# Patient Record
Sex: MALE | Race: WHITE | NOT HISPANIC OR LATINO | Employment: FULL TIME | URBAN - METROPOLITAN AREA
[De-identification: names, ages, dates, MRNs, and addresses within clinical notes are randomized per-mention and may not be internally consistent; named-entity substitution may affect disease eponyms.]

---

## 2022-09-21 DIAGNOSIS — Z86.79 HISTORY OF ATRIAL FIBRILLATION: ICD-10-CM

## 2022-09-21 DIAGNOSIS — I48.92 ATRIAL FLUTTER WITH RAPID VENTRICULAR RESPONSE (HCC): ICD-10-CM

## 2022-09-22 LAB — SARS-COV-2 RNA RESP QL NAA+PROBE: NEGATIVE

## 2023-03-23 PROBLEM — H53.9 VISION DISTURBANCE: Status: ACTIVE | Noted: 2023-03-23

## 2023-06-12 ENCOUNTER — APPOINTMENT (EMERGENCY)
Dept: RADIOLOGY | Facility: HOSPITAL | Age: 43
End: 2023-06-12
Payer: COMMERCIAL

## 2023-06-12 ENCOUNTER — APPOINTMENT (EMERGENCY)
Dept: CT IMAGING | Facility: HOSPITAL | Age: 43
End: 2023-06-12
Payer: COMMERCIAL

## 2023-06-12 ENCOUNTER — HOSPITAL ENCOUNTER (EMERGENCY)
Facility: HOSPITAL | Age: 43
Discharge: HOME | End: 2023-06-12
Attending: EMERGENCY MEDICINE
Payer: COMMERCIAL

## 2023-06-12 VITALS
HEART RATE: 73 BPM | SYSTOLIC BLOOD PRESSURE: 154 MMHG | DIASTOLIC BLOOD PRESSURE: 73 MMHG | RESPIRATION RATE: 16 BRPM | OXYGEN SATURATION: 100 % | TEMPERATURE: 97.8 F

## 2023-06-12 DIAGNOSIS — M79.605 LEFT LEG PAIN: Primary | ICD-10-CM

## 2023-06-12 LAB
ALBUMIN SERPL BCP-MCNC: 4.3 G/DL (ref 3.5–5)
ALP SERPL-CCNC: 64 U/L (ref 34–104)
ALT SERPL W P-5'-P-CCNC: 13 U/L (ref 7–52)
ANION GAP SERPL CALCULATED.3IONS-SCNC: 6 MMOL/L (ref 4–13)
AST SERPL W P-5'-P-CCNC: 15 U/L (ref 13–39)
BASOPHILS # BLD AUTO: 0.05 THOUSANDS/ÂΜL (ref 0–0.1)
BASOPHILS NFR BLD AUTO: 1 % (ref 0–1)
BILIRUB SERPL-MCNC: 0.6 MG/DL (ref 0.2–1)
BUN SERPL-MCNC: 19 MG/DL (ref 5–25)
CALCIUM SERPL-MCNC: 8.9 MG/DL (ref 8.4–10.2)
CHLORIDE SERPL-SCNC: 104 MMOL/L (ref 96–108)
CK SERPL-CCNC: 50 U/L (ref 39–308)
CO2 SERPL-SCNC: 29 MMOL/L (ref 21–32)
CREAT SERPL-MCNC: 0.87 MG/DL (ref 0.6–1.3)
EOSINOPHIL # BLD AUTO: 0.16 THOUSAND/ÂΜL (ref 0–0.61)
EOSINOPHIL NFR BLD AUTO: 2 % (ref 0–6)
ERYTHROCYTE [DISTWIDTH] IN BLOOD BY AUTOMATED COUNT: 12.1 % (ref 11.6–15.1)
GFR SERPL CREATININE-BSD FRML MDRD: 107 ML/MIN/1.73SQ M
GLUCOSE SERPL-MCNC: 124 MG/DL (ref 65–140)
HCT VFR BLD AUTO: 49.3 % (ref 36.5–49.3)
HGB BLD-MCNC: 16.8 G/DL (ref 12–17)
IMM GRANULOCYTES # BLD AUTO: 0.06 THOUSAND/UL (ref 0–0.2)
IMM GRANULOCYTES NFR BLD AUTO: 1 % (ref 0–2)
LYMPHOCYTES # BLD AUTO: 1.37 THOUSANDS/ÂΜL (ref 0.6–4.47)
LYMPHOCYTES NFR BLD AUTO: 13 % (ref 14–44)
MCH RBC QN AUTO: 31.8 PG (ref 26.8–34.3)
MCHC RBC AUTO-ENTMCNC: 34.1 G/DL (ref 31.4–37.4)
MCV RBC AUTO: 93 FL (ref 82–98)
MONOCYTES # BLD AUTO: 0.73 THOUSAND/ÂΜL (ref 0.17–1.22)
MONOCYTES NFR BLD AUTO: 7 % (ref 4–12)
NEUTROPHILS # BLD AUTO: 8.42 THOUSANDS/ÂΜL (ref 1.85–7.62)
NEUTS SEG NFR BLD AUTO: 76 % (ref 43–75)
NRBC BLD AUTO-RTO: 0 /100 WBCS
PLATELET # BLD AUTO: 209 THOUSANDS/UL (ref 149–390)
PMV BLD AUTO: 9.7 FL (ref 8.9–12.7)
POTASSIUM SERPL-SCNC: 4.5 MMOL/L (ref 3.5–5.3)
PROT SERPL-MCNC: 6.9 G/DL (ref 6.4–8.4)
RBC # BLD AUTO: 5.28 MILLION/UL (ref 3.88–5.62)
SODIUM SERPL-SCNC: 139 MMOL/L (ref 135–147)
WBC # BLD AUTO: 10.79 THOUSAND/UL (ref 4.31–10.16)

## 2023-06-12 PROCEDURE — 80053 COMPREHEN METABOLIC PANEL: CPT | Performed by: PHYSICIAN ASSISTANT

## 2023-06-12 PROCEDURE — 85025 COMPLETE CBC W/AUTO DIFF WBC: CPT | Performed by: PHYSICIAN ASSISTANT

## 2023-06-12 PROCEDURE — 73701 CT LOWER EXTREMITY W/DYE: CPT

## 2023-06-12 PROCEDURE — 36415 COLL VENOUS BLD VENIPUNCTURE: CPT | Performed by: PHYSICIAN ASSISTANT

## 2023-06-12 PROCEDURE — G1004 CDSM NDSC: HCPCS

## 2023-06-12 PROCEDURE — 73552 X-RAY EXAM OF FEMUR 2/>: CPT

## 2023-06-12 PROCEDURE — 82550 ASSAY OF CK (CPK): CPT | Performed by: PHYSICIAN ASSISTANT

## 2023-06-12 RX ADMIN — IOHEXOL 100 ML: 350 INJECTION, SOLUTION INTRAVENOUS at 12:02

## 2023-06-13 NOTE — ED PROVIDER NOTES
History  Chief Complaint   Patient presents with   • Leg Pain     Left thigh pain since Wednesday  No known injury but is active at work     72-year-old male presents to the emergency department with complaints of left-sided leg pain  States that he has had intermittent pain in the left thigh over the past several days  Describes pain as sharp in nature and feels like it originates from deep within the muscle  States that his leg feels warm when pain starts both on the inside and to the touch  He denies overlying skin changes presently  States the pain last several seconds to minutes prior to spontaneous resolution  Denies recent injury to the area  Does have history of back pain without radiation into the leg  History provided by:  Patient   used: No    Leg Pain  Location:  Leg  Injury: no    Leg location:  L upper leg  Pain details:     Quality:  Sharp    Radiates to:  Does not radiate    Severity:  Moderate    Onset quality:  Gradual    Timing:  Intermittent    Progression:  Waxing and waning  Chronicity:  New  Foreign body present:  No foreign bodies  Prior injury to area:  No  Relieved by:  Nothing  Worsened by:  Nothing  Ineffective treatments:  None tried  Associated symptoms: back pain    Associated symptoms: no decreased ROM, no fatigue, no fever, no itching, no muscle weakness, no neck pain, no numbness, no stiffness, no swelling and no tingling        Prior to Admission Medications   Prescriptions Last Dose Informant Patient Reported? Taking?    Eliquis 5 MG 6/12/2023 at 0730  No Yes   Sig: TAKE 1 TABLET BY MOUTH TWICE A DAY   levothyroxine 175 mcg tablet 6/12/2023 at 0730  No Yes   Sig: TAKE 1 TABLET (175 MCG TOTAL) BY MOUTH DAILY IN THE EARLY MORNING   naltrexone (REVIA) 50 mg tablet   No No   Sig: Take 1 tablet (50 mg total) by mouth daily   Patient not taking: Reported on 3/23/2023   nicotine (NICODERM CQ) 21 mg/24 hr TD 24 hr patch  Self No No   Sig: Place 1 patch on the skin daily   Patient not taking: Reported on 3/23/2023   nicotine polacrilex (NICORETTE) 2 mg gum   No No   Sig: Chew 1 each (2 mg total) every 2 (two) hours as needed for smoking cessation   Patient not taking: Reported on 3/23/2023      Facility-Administered Medications: None       Past Medical History:   Diagnosis Date   • Disease of thyroid gland    • History of atrial fibrillation     assocaiated with thyroid disease   • Stroke ([facility] [address] )     stroke syndrome, occipital infarction- associated w/ afib   • Thrombocytopenia ([facility] [address] )        Past Surgical History:   Procedure Laterality Date   • CARDIAC ELECTROPHYSIOLOGY PROCEDURE N/A 9/27/2022    Procedure: Cardiac eps/afib ablation;  Surgeon: Rosi Foy DO;  Location: [facility]; Service: Cardiology   • LIPOMA RESECTION Left 8/15/2017    Procedure: CALF FOREIGN BODY REMOVAL WITH FLUORO;  Surgeon: Santiago Aragon MD;  Location:  MAIN OR;  Service: General   • TOTAL THYROIDECTOMY         Family History   Problem Relation Age of Onset   • Thyroid disease Mother    • Multiple sclerosis Mother    • Heart disease Father    • Thyroid disease Sister      I have reviewed and agree with the history as documented  E-Cigarette/Vaping   • E-Cigarette Use Former User      E-Cigarette/Vaping Substances     Social History     Tobacco Use   • Smoking status: Every Day     Packs/day: 0 50     Types: Cigarettes   • Smokeless tobacco: Never   • Tobacco comments:     working on quitting  USING VAPE PEN   Vaping Use   • Vaping Use: Former   Substance Use Topics   • Alcohol use: Yes     Comment: daily   • Drug use: No       Review of Systems   Constitutional: Negative for activity change, appetite change, chills, fatigue and fever  HENT: Negative for congestion, dental problem, drooling, ear discharge, ear pain, mouth sores, nosebleeds, rhinorrhea, sore throat and trouble swallowing  Eyes: Negative for pain, discharge and itching     Respiratory: Negative for cough, chest tightness, shortness of breath and wheezing  Cardiovascular: Negative for chest pain and palpitations  Gastrointestinal: Negative for abdominal pain, blood in stool, constipation, diarrhea, nausea and vomiting  Endocrine: Negative for cold intolerance and heat intolerance  Genitourinary: Negative for difficulty urinating, dysuria, flank pain, frequency and urgency  Musculoskeletal: Positive for back pain and myalgias (leg pain)  Negative for neck pain and stiffness  Skin: Negative for itching, rash and wound  Allergic/Immunologic: Negative for food allergies and immunocompromised state  Neurological: Negative for dizziness, seizures, syncope, weakness, numbness and headaches  Psychiatric/Behavioral: Negative for agitation, behavioral problems and confusion  Physical Exam  Physical Exam  Vitals and nursing note reviewed  Constitutional:       Appearance: He is well-developed  HENT:      Head: Normocephalic and atraumatic  Cardiovascular:      Rate and Rhythm: Normal rate and regular rhythm  Pulmonary:      Effort: Pulmonary effort is normal  No respiratory distress  Breath sounds: No wheezing, rhonchi or rales  Musculoskeletal:        Legs:    Skin:     General: Skin is warm and dry  Neurological:      Mental Status: He is alert and oriented to person, place, and time     Psychiatric:         Mood and Affect: Mood normal          Behavior: Behavior normal          Vital Signs  ED Triage Vitals   Temperature Pulse Respirations Blood Pressure SpO2   06/12/23 0938 06/12/23 0927 06/12/23 0927 06/12/23 0927 06/12/23 0927   97 8 °F (36 6 °C) 73 16 154/73 100 %      Temp src Heart Rate Source Patient Position - Orthostatic VS BP Location FiO2 (%)   -- -- 06/12/23 0927 06/12/23 0927 --     Sitting Right arm       Pain Score       --                  Vitals:    06/12/23 0927   BP: 154/73   Pulse: 73   Patient Position - Orthostatic VS: Sitting         Visual Acuity      ED Medications  Medications   iohexol (OMNIPAQUE) 350 MG/ML injection (SINGLE-DOSE) 100 mL (100 mL Intravenous Given 6/12/23 1202)       Diagnostic Studies  Results Reviewed     Procedure Component Value Units Date/Time    Comprehensive metabolic panel [[id number]] Collected: 06/12/23 1001    Lab Status: Final result Specimen: Blood from Arm, Right Updated: 06/12/23 1027     Sodium 139 mmol/L      Potassium 4 5 mmol/L      Chloride 104 mmol/L      CO2 29 mmol/L      ANION GAP 6 mmol/L      BUN 19 mg/dL      Creatinine 0 87 mg/dL      Glucose 124 mg/dL      Calcium 8 9 mg/dL      AST 15 U/L      ALT 13 U/L      Alkaline Phosphatase 64 U/L      Total Protein 6 9 g/dL      Albumin 4 3 g/dL      Total Bilirubin 0 60 mg/dL      eGFR 107 ml/min/1 73sq m     Narrative:      National Kidney Disease Foundation guidelines for Chronic Kidney Disease (CKD):   •  Stage 1 with normal or high GFR (GFR > 90 mL/min/1 73 square meters)  •  Stage 2 Mild CKD (GFR = 60-89 mL/min/1 73 square meters)  •  Stage 3A Moderate CKD (GFR = 45-59 mL/min/1 73 square meters)  •  Stage 3B Moderate CKD (GFR = 30-44 mL/min/1 73 square meters)  •  Stage 4 Severe CKD (GFR = 15-29 mL/min/1 73 square meters)  •  Stage 5 End Stage CKD (GFR <15 mL/min/1 73 square meters)  Note: GFR calculation is accurate only with a steady state creatinine    CK [[id number]]  (Normal) Collected: 06/12/23 1001    Lab Status: Final result Specimen: Blood from Arm, Right Updated: 06/12/23 1027     Total CK 50 U/L     CBC and differential [[id number]]  (Abnormal) Collected: 06/12/23 1001    Lab Status: Final result Specimen: Blood from Arm, Right Updated: 06/12/23 1011     WBC 10 79 Thousand/uL      RBC 5 28 Million/uL      Hemoglobin 16 8 g/dL      Hematocrit 49 3 %      MCV 93 fL      MCH 31 8 pg      MCHC 34 1 g/dL      RDW 12 1 %      MPV 9 7 fL      Platelets 936 Thousands/uL      nRBC 0 /100 WBCs      Neutrophils Relative 76 %      Immat GRANS % 1 %      Lymphocytes Relative 13 %      Monocytes Relative 7 %      Eosinophils Relative 2 %      Basophils Relative 1 %      Neutrophils Absolute 8 42 Thousands/µL      Immature Grans Absolute 0 06 Thousand/uL      Lymphocytes Absolute 1 37 Thousands/µL      Monocytes Absolute 0 73 Thousand/µL      Eosinophils Absolute 0 16 Thousand/µL      Basophils Absolute 0 05 Thousands/µL                  CT lower extremity w contrast left   Final Result by Julian Velez MD (06/12 1245)      No acute osseous abnormality  No discrete soft tissue mass or fluid collection  Workstation performed: [id number]         XR femur 2 views LEFT   ED Interpretation by Sarath Neal PA-C (06/12 1024)   Normal xray of the femur      Final Result by Frandy Allen MD (06/12 1633)      No acute osseous abnormality  Workstation performed: [id number]                    Procedures  Procedures         ED Course                                             Medical Decision Making  Differential diagnosis includes but not limited to: Retained foreign body, deep-seated infection, musculoskeletal pain, referred pain    Left leg pain: acute illness or injury  Amount and/or Complexity of Data Reviewed  Labs: ordered  Radiology: ordered and independent interpretation performed  Risk  Prescription drug management  Disposition  Final diagnoses:   Left leg pain     Time reflects when diagnosis was documented in both MDM as applicable and the Disposition within this note     Time User Action Codes Description Comment    6/12/2023  1:04 PM Carmela Lozada Add [Z79 [id number]] Left leg pain       ED Disposition     ED Disposition   Discharge    Condition   Stable    Date/Time   Mon Jun 12, 2023  1:04 PM    Comment   Vlad Guzman discharge to home/self care  Follow-up Information     Follow up With Specialties Details Why [address], MD Family Medicine   [address]  [phone]       [facility] Orthopedic Surgery Schedule an appointment as soon as possible for a visit   [address] [facility] [address] [facility], [address] [place] [address], [phone]          Discharge Medication List as of 6/12/2023  1:05 PM      CONTINUE these medications which have NOT CHANGED    Details   Eliquis 5 MG TAKE 1 TABLET BY MOUTH TWICE A DAY, Normal      levothyroxine 175 mcg tablet TAKE 1 TABLET (175 MCG TOTAL) BY MOUTH DAILY IN THE EARLY MORNING, Starting Sat 4/29/2023, Until Fri 7/28/2023, Normal      naltrexone (REVIA) 50 mg tablet Take 1 tablet (50 mg total) by mouth daily, Starting Wed 11/2/2022, Normal      nicotine (NICODERM CQ) 21 mg/24 hr TD 24 hr patch Place 1 patch on the skin daily, Starting Wed 9/28/2022, Normal      nicotine polacrilex (NICORETTE) 2 mg gum Chew 1 each (2 mg total) every 2 (two) hours as needed for smoking cessation, Starting Sun 6/5/2022, Normal             No discharge procedures on file      PDMP Review       Value Time User    PDMP Reviewed  Yes 8/11/2022  1:17 AM Barber Stacy MD          ED Provider  Electronically Signed by           Rosie Raman PA-C  [facility]13/23 [facility]  06/13/23 5277

## 2023-10-12 ENCOUNTER — OFFICE VISIT (OUTPATIENT)
Dept: FAMILY MEDICINE CLINIC | Facility: CLINIC | Age: 43
End: 2023-10-12
Payer: COMMERCIAL

## 2023-10-12 VITALS
SYSTOLIC BLOOD PRESSURE: 123 MMHG | WEIGHT: 210 LBS | DIASTOLIC BLOOD PRESSURE: 76 MMHG | HEART RATE: 66 BPM | OXYGEN SATURATION: 99 % | RESPIRATION RATE: 16 BRPM | BODY MASS INDEX: 28.44 KG/M2 | HEIGHT: 72 IN | TEMPERATURE: 98.6 F

## 2023-10-12 DIAGNOSIS — F17.200 NICOTINE ADDICTION: ICD-10-CM

## 2023-10-12 DIAGNOSIS — Z12.5 SCREENING FOR PROSTATE CANCER: ICD-10-CM

## 2023-10-12 DIAGNOSIS — Z13.6 SCREENING FOR CARDIOVASCULAR CONDITION: ICD-10-CM

## 2023-10-12 DIAGNOSIS — E89.0 POSTOPERATIVE HYPOTHYROIDISM: Primary | ICD-10-CM

## 2023-10-12 DIAGNOSIS — Z11.59 ENCOUNTER FOR HEPATITIS C SCREENING TEST FOR LOW RISK PATIENT: ICD-10-CM

## 2023-10-12 DIAGNOSIS — Z13.1 SCREENING FOR DIABETES MELLITUS: ICD-10-CM

## 2023-10-12 DIAGNOSIS — Z11.4 SCREENING FOR HIV (HUMAN IMMUNODEFICIENCY VIRUS): ICD-10-CM

## 2023-10-12 PROCEDURE — 99214 OFFICE O/P EST MOD 30 MIN: CPT | Performed by: FAMILY MEDICINE

## 2023-10-12 NOTE — PROGRESS NOTES
Name: Lieutenant Hernández      : 1980      MRN: [id number]  Encounter Provider: Debbie Briceno MD  Encounter Date: 10/12/2023   Encounter department: [address] [place] [address]     1. Postoperative hypothyroidism  -     TSH, 3rd generation with Free T4 reflex; Future  -     Vitamin D 25 hydroxy; Future  -     US thyroid; Future; Expected date: 10/12/2023    2. Nicotine addiction  -     nicotine polacrilex (NICORETTE) 2 mg gum; Chew 1 each (2 mg total) every 2 (two) hours as needed for smoking cessation  -     Vitamin D 25 hydroxy; Future    3. Screening for diabetes mellitus  -     TSH, 3rd generation with Free T4 reflex; Future  -     Hemoglobin A1C; Future  -     Albumin / creatinine urine ratio; Future    4. Screening for cardiovascular condition  -     CBC and differential; Future  -     Comprehensive metabolic panel; Future  -     Lipid panel; Future  -     UA w Reflex to Microscopic w Reflex to Culture; Future; Expected date: 10/12/2023  -     Albumin / creatinine urine ratio; Future    5. Screening for prostate cancer  -     PSA, Total Screen; Future    6. Screening for HIV (human immunodeficiency virus)  -     [facility]; Future    7. Encounter for hepatitis C screening test for low risk patient  -     Hepatitis C antibody; Future      Patient was ordered basic labs occluding the other test above. Patient gave permission for the PSA and the screening test for HIV and hepatitis C. Patient was encouraged to cut to quit tobacco completely. Patient was asked to get a thyroid sonogram to evaluate the thyroid bed status post surgery. Patient will ask his cardiologist as to how many days prior to his dental procedure procedures he will need just hold his Eliquis. Patient declined to get the Flu vaccine and the pneumonia vaccine as well. RTO 2 months for physical.    BMI Counseling: Body mass index is 28.48 kg/m².  The BMI is above normal. Nutrition recommendations include decreasing portion sizes, encouraging healthy choices of fruits and vegetables, consuming healthier snacks and moderation in carbohydrate intake. Exercise recommendations include exercising 3-5 times per week. No pharmacotherapy was ordered. Patient referred to PCP. Rationale for BMI follow-up plan is due to patient being overweight or obese. Tobacco Cessation Counseling: Tobacco cessation counseling was provided. The patient is sincerely urged to quit consumption of tobacco. He is ready to quit tobacco. Medication options and side effects of medication discussed. Patient agreed to medication. Nicotine patch was prescribed. Refilled his patch today, and counseled to quit completely. Subjective      46-year-old male here for routine follow-up. Patient wants a refill on his nicotine patch. Says he is trying to cut down to quit. Patient has A-fib and is on Eliquis per cardio. Patient is status post thyroidectomy for Graves' disease. Patient also has a history of stroke and with no deficits. Patient is not taking his naloxone because he felt foggy and dizzy from it so he stopped it. Patient does not drink as much as he did before. Patient denies anxiety depression has no SI HI. Review of Systems   Constitutional:  Negative for chills, fatigue and fever. HENT:  Positive for dental problem. Negative for congestion and sore throat. Eyes:  Negative for visual disturbance. Respiratory:  Negative for cough and shortness of breath. Cardiovascular:  Negative for chest pain and palpitations. Gastrointestinal:  Negative for abdominal pain and blood in stool. Genitourinary:  Negative for dysuria and hematuria. Skin:  Negative for rash. Neurological:  Negative for dizziness and headaches. Psychiatric/Behavioral:  Negative for dysphoric mood. The patient is not nervous/anxious.         Current Outpatient Medications on File Prior to Visit   Medication Sig • Eliquis 5 MG TAKE 1 TABLET BY MOUTH TWICE A DAY   • levothyroxine 175 mcg tablet TAKE 1 TABLET (175 MCG TOTAL) BY MOUTH DAILY IN THE EARLY MORNING   • nicotine (NICODERM CQ) 21 mg/24 hr TD 24 hr patch Place 1 patch on the skin daily (Patient not taking: Reported on 3/23/2023)   • [DISCONTINUED] naltrexone (REVIA) 50 mg tablet Take 1 tablet (50 mg total) by mouth daily (Patient not taking: Reported on 10/12/2023)   • [DISCONTINUED] nicotine polacrilex (NICORETTE) 2 mg gum Chew 1 each (2 mg total) every 2 (two) hours as needed for smoking cessation (Patient not taking: Reported on 3/23/2023)       Objective     /76 (BP Location: Left arm, Patient Position: Sitting, Cuff Size: Adult)   Pulse 66   Temp 98.6 °F (37 °C)   Resp 16   Ht 6' (1.829 m)   Wt 95.3 kg (210 lb)   SpO2 99%   BMI 28.48 kg/m²     Physical Exam  Constitutional:       General: He is not in acute distress. Appearance: Normal appearance. He is not ill-appearing. HENT:      Head: Normocephalic and atraumatic. Mouth/Throat:      Mouth: Mucous membranes are moist.      Pharynx: Oropharynx is clear. Comments: Poor teeth and patient is in the process of dental treatment. Eyes:      Extraocular Movements: Extraocular movements intact. Conjunctiva/sclera: Conjunctivae normal.   Neck:      Vascular: No carotid bruit. Cardiovascular:      Rate and Rhythm: Normal rate and regular rhythm. Pulmonary:      Effort: Pulmonary effort is normal.      Breath sounds: Normal breath sounds. Musculoskeletal:      Right lower leg: No edema. Left lower leg: No edema. Comments: No calf tenderness bilateral   Lymphadenopathy:      Cervical: No cervical adenopathy. Skin:     General: Skin is warm. Neurological:      General: No focal deficit present. Mental Status: He is alert and oriented to person, place, and time. Cranial Nerves: No cranial nerve deficit.    Psychiatric:         Mood and Affect: Mood normal. Behavior: Behavior normal.         Thought Content:  Thought content normal.       Willie Serrato MD

## 2023-10-18 DIAGNOSIS — F17.200 NICOTINE ADDICTION: ICD-10-CM

## 2023-10-18 RX ORDER — NICOTINE 21 MG/24HR
1 PATCH, TRANSDERMAL 24 HOURS TRANSDERMAL DAILY
Qty: 30 PATCH | Refills: 0 | Status: SHIPPED | OUTPATIENT
Start: 2023-10-18

## 2023-10-24 DIAGNOSIS — F17.200 NICOTINE ADDICTION: ICD-10-CM

## 2023-10-24 RX ORDER — NICOTINE 21 MG/24HR
1 PATCH, TRANSDERMAL 24 HOURS TRANSDERMAL DAILY
Qty: 30 PATCH | Refills: 0 | OUTPATIENT
Start: 2023-10-24

## 2023-10-29 DIAGNOSIS — E03.9 HYPOTHYROIDISM, UNSPECIFIED TYPE: ICD-10-CM

## 2023-10-29 RX ORDER — LEVOTHYROXINE SODIUM 175 UG/1
175 TABLET ORAL
Qty: 90 TABLET | Refills: 0 | Status: SHIPPED | OUTPATIENT
Start: 2023-10-29 | End: 2024-01-27

## 2023-12-16 ENCOUNTER — APPOINTMENT (OUTPATIENT)
Dept: LAB | Facility: CLINIC | Age: 43
End: 2023-12-16
Payer: COMMERCIAL

## 2023-12-16 DIAGNOSIS — E89.0 POSTOPERATIVE HYPOTHYROIDISM: ICD-10-CM

## 2023-12-16 DIAGNOSIS — F17.200 NICOTINE ADDICTION: ICD-10-CM

## 2023-12-16 DIAGNOSIS — Z11.4 SCREENING FOR HIV (HUMAN IMMUNODEFICIENCY VIRUS): ICD-10-CM

## 2023-12-16 DIAGNOSIS — Z11.59 ENCOUNTER FOR HEPATITIS C SCREENING TEST FOR LOW RISK PATIENT: ICD-10-CM

## 2023-12-16 DIAGNOSIS — Z12.5 SCREENING FOR PROSTATE CANCER: ICD-10-CM

## 2023-12-16 DIAGNOSIS — Z13.1 SCREENING FOR DIABETES MELLITUS: ICD-10-CM

## 2023-12-16 DIAGNOSIS — F10.10 ALCOHOL ABUSE: ICD-10-CM

## 2023-12-16 DIAGNOSIS — Z13.6 SCREENING FOR CARDIOVASCULAR CONDITION: ICD-10-CM

## 2023-12-16 LAB
25(OH)D3 SERPL-MCNC: 31.8 NG/ML (ref 30–100)
ALBUMIN SERPL BCP-MCNC: 4.4 G/DL (ref 3.5–5)
ALP SERPL-CCNC: 51 U/L (ref 34–104)
ALT SERPL W P-5'-P-CCNC: 18 U/L (ref 7–52)
ANION GAP SERPL CALCULATED.3IONS-SCNC: 6 MMOL/L
AST SERPL W P-5'-P-CCNC: 22 U/L (ref 13–39)
BASOPHILS # BLD AUTO: 0.04 THOUSANDS/ÂΜL (ref 0–0.1)
BASOPHILS NFR BLD AUTO: 1 % (ref 0–1)
BILIRUB SERPL-MCNC: 0.55 MG/DL (ref 0.2–1)
BILIRUB UR QL STRIP: NEGATIVE
BUN SERPL-MCNC: 14 MG/DL (ref 5–25)
CALCIUM SERPL-MCNC: 10.2 MG/DL (ref 8.4–10.2)
CHLORIDE SERPL-SCNC: 107 MMOL/L (ref 96–108)
CHOLEST SERPL-MCNC: 210 MG/DL
CLARITY UR: CLEAR
CO2 SERPL-SCNC: 29 MMOL/L (ref 21–32)
COLOR UR: YELLOW
CREAT SERPL-MCNC: 0.91 MG/DL (ref 0.6–1.3)
CREAT UR-MCNC: 132.9 MG/DL
EOSINOPHIL # BLD AUTO: 0.32 THOUSAND/ÂΜL (ref 0–0.61)
EOSINOPHIL NFR BLD AUTO: 4 % (ref 0–6)
ERYTHROCYTE [DISTWIDTH] IN BLOOD BY AUTOMATED COUNT: 11.9 % (ref 11.6–15.1)
EST. AVERAGE GLUCOSE BLD GHB EST-MCNC: 94 MG/DL
FOLATE SERPL-MCNC: 9.9 NG/ML
GFR SERPL CREATININE-BSD FRML MDRD: 104 ML/MIN/1.73SQ M
GLUCOSE P FAST SERPL-MCNC: 96 MG/DL (ref 65–99)
GLUCOSE UR STRIP-MCNC: NEGATIVE MG/DL
HBA1C MFR BLD: 4.9 %
HCT VFR BLD AUTO: 50.5 % (ref 36.5–49.3)
HCV AB SER QL: NORMAL
HDLC SERPL-MCNC: 67 MG/DL
HGB BLD-MCNC: 17.1 G/DL (ref 12–17)
HGB UR QL STRIP.AUTO: NEGATIVE
IMM GRANULOCYTES # BLD AUTO: 0.03 THOUSAND/UL (ref 0–0.2)
IMM GRANULOCYTES NFR BLD AUTO: 0 % (ref 0–2)
KETONES UR STRIP-MCNC: NEGATIVE MG/DL
LDLC SERPL CALC-MCNC: 130 MG/DL (ref 0–100)
LEUKOCYTE ESTERASE UR QL STRIP: NEGATIVE
LYMPHOCYTES # BLD AUTO: 1.64 THOUSANDS/ÂΜL (ref 0.6–4.47)
LYMPHOCYTES NFR BLD AUTO: 21 % (ref 14–44)
MCH RBC QN AUTO: 32.9 PG (ref 26.8–34.3)
MCHC RBC AUTO-ENTMCNC: 33.9 G/DL (ref 31.4–37.4)
MCV RBC AUTO: 97 FL (ref 82–98)
MICROALBUMIN UR-MCNC: <7 MG/L
MICROALBUMIN/CREAT 24H UR: <5 MG/G CREATININE (ref 0–30)
MONOCYTES # BLD AUTO: 0.62 THOUSAND/ÂΜL (ref 0.17–1.22)
MONOCYTES NFR BLD AUTO: 8 % (ref 4–12)
NEUTROPHILS # BLD AUTO: 5.31 THOUSANDS/ÂΜL (ref 1.85–7.62)
NEUTS SEG NFR BLD AUTO: 66 % (ref 43–75)
NITRITE UR QL STRIP: NEGATIVE
NONHDLC SERPL-MCNC: 143 MG/DL
NRBC BLD AUTO-RTO: 0 /100 WBCS
PH UR STRIP.AUTO: 6.5 [PH]
PLATELET # BLD AUTO: 211 THOUSANDS/UL (ref 149–390)
PMV BLD AUTO: 10.4 FL (ref 8.9–12.7)
POTASSIUM SERPL-SCNC: 4.5 MMOL/L (ref 3.5–5.3)
PROT SERPL-MCNC: 6.7 G/DL (ref 6.4–8.4)
PROT UR STRIP-MCNC: NEGATIVE MG/DL
PSA SERPL-MCNC: 0.62 NG/ML (ref 0–4)
RBC # BLD AUTO: 5.19 MILLION/UL (ref 3.88–5.62)
SODIUM SERPL-SCNC: 142 MMOL/L (ref 135–147)
SP GR UR STRIP.AUTO: 1.02 (ref 1–1.03)
TRIGL SERPL-MCNC: 63 MG/DL
TSH SERPL DL<=0.05 MIU/L-ACNC: 1.29 UIU/ML (ref 0.45–4.5)
UROBILINOGEN UR STRIP-ACNC: <2 MG/DL
VIT B12 SERPL-MCNC: 213 PG/ML (ref 180–914)
WBC # BLD AUTO: 7.96 THOUSAND/UL (ref 4.31–10.16)

## 2023-12-16 PROCEDURE — 82746 ASSAY OF FOLIC ACID SERUM: CPT

## 2023-12-16 PROCEDURE — 82043 UR ALBUMIN QUANTITATIVE: CPT

## 2023-12-16 PROCEDURE — 36415 COLL VENOUS BLD VENIPUNCTURE: CPT

## 2023-12-16 PROCEDURE — 80061 LIPID PANEL: CPT

## 2023-12-16 PROCEDURE — 82607 VITAMIN B-12: CPT

## 2023-12-16 PROCEDURE — 83036 HEMOGLOBIN GLYCOSYLATED A1C: CPT

## 2023-12-16 PROCEDURE — 80053 COMPREHEN METABOLIC PANEL: CPT

## 2023-12-16 PROCEDURE — 86803 HEPATITIS C AB TEST: CPT

## 2023-12-16 PROCEDURE — 85025 COMPLETE CBC W/AUTO DIFF WBC: CPT

## 2023-12-16 PROCEDURE — G0103 PSA SCREENING: HCPCS

## 2023-12-16 PROCEDURE — 87536 HIV-1 QUANT&REVRSE TRNSCRPJ: CPT

## 2023-12-16 PROCEDURE — 82306 VITAMIN D 25 HYDROXY: CPT

## 2023-12-16 PROCEDURE — 84443 ASSAY THYROID STIM HORMONE: CPT

## 2023-12-16 PROCEDURE — 81003 URINALYSIS AUTO W/O SCOPE: CPT

## 2023-12-16 PROCEDURE — 82570 ASSAY OF URINE CREATININE: CPT

## 2023-12-18 LAB — HIV1 RNA # PLAS NAA DL=20: NOT DETECTED {COPIES}/ML

## 2024-01-21 ENCOUNTER — APPOINTMENT (EMERGENCY)
Dept: RADIOLOGY | Facility: HOSPITAL | Age: 44
End: 2024-01-21
Payer: COMMERCIAL

## 2024-01-21 ENCOUNTER — HOSPITAL ENCOUNTER (EMERGENCY)
Facility: HOSPITAL | Age: 44
Discharge: HOME | End: 2024-01-21
Attending: EMERGENCY MEDICINE | Admitting: EMERGENCY MEDICINE
Payer: COMMERCIAL

## 2024-01-21 VITALS
HEART RATE: 68 BPM | DIASTOLIC BLOOD PRESSURE: 78 MMHG | OXYGEN SATURATION: 100 % | SYSTOLIC BLOOD PRESSURE: 136 MMHG | RESPIRATION RATE: 18 BRPM | TEMPERATURE: 99.1 F

## 2024-01-21 DIAGNOSIS — S62.009A SCAPHOID FRACTURE: Primary | ICD-10-CM

## 2024-01-21 DIAGNOSIS — S62.009A: ICD-10-CM

## 2024-01-21 PROCEDURE — 73110 X-RAY EXAM OF WRIST: CPT

## 2024-01-21 PROCEDURE — 99284 EMERGENCY DEPT VISIT MOD MDM: CPT | Performed by: EMERGENCY MEDICINE

## 2024-01-21 PROCEDURE — 90715 TDAP VACCINE 7 YRS/> IM: CPT | Performed by: EMERGENCY MEDICINE

## 2024-01-21 PROCEDURE — 29125 APPL SHORT ARM SPLINT STATIC: CPT | Performed by: EMERGENCY MEDICINE

## 2024-01-21 PROCEDURE — 90471 IMMUNIZATION ADMIN: CPT

## 2024-01-21 PROCEDURE — 99283 EMERGENCY DEPT VISIT LOW MDM: CPT

## 2024-01-21 RX ORDER — ACETAMINOPHEN 325 MG/1
650 TABLET ORAL ONCE
Status: COMPLETED | OUTPATIENT
Start: 2024-01-21 | End: 2024-01-21

## 2024-01-21 RX ADMIN — TETANUS TOXOID, REDUCED DIPHTHERIA TOXOID AND ACELLULAR PERTUSSIS VACCINE, ADSORBED 0.5 ML: 5; 2.5; 8; 8; 2.5 SUSPENSION INTRAMUSCULAR at 10:01

## 2024-01-21 RX ADMIN — ACETAMINOPHEN 650 MG: 325 TABLET, FILM COATED ORAL at 09:59

## 2024-01-21 NOTE — ED PROVIDER NOTES
History  Chief Complaint   Patient presents with    Fall     Pt reports fall on ice last night. (-)HS/LOC, (+) eliquis. Pt c/o 6/10 L wrist/hand pain.      43-year-old right-handed male presents emergency department for evaluation of left wrist pain.  Patient states he was carrying packages last evening when he slipped on ice falling on the left wrist.  Patient is having pain at the dorsum of the wrist.  He notes swelling and decreased range of motion.  No previous wrist injury.  Patient takes Eliquis.  He did not have head strike denies headache or neck pain.  Patient has a small abrasion to the right thumb, does not believe that his tetanus immunization is up-to-date      History provided by:  Patient and medical records   used: No    Fall  Mechanism of injury: fall    Injury location:  Shoulder/arm  Shoulder/arm injury location:  L wrist  Incident location:  Outdoors  Time since incident:  1 day  Arrived directly from scene: no    Fall:     Fall occurred: Slipped on ice.    Height of fall:  From standing    Impact surface:  Ice    Point of impact:  Outstretched arms  Tetanus status:  Out of date  Prior to arrival data:     Patient ambulatory at scene: yes      Loss of consciousness: no      Amnesic to event: no    Associated symptoms: no back pain, no difficulty breathing and no headaches    Risk factors: anticoagulation therapy        Prior to Admission Medications   Prescriptions Last Dose Informant Patient Reported? Taking?   Eliquis 5 MG  Self No No   Sig: TAKE 1 TABLET BY MOUTH TWICE A DAY   levothyroxine 175 mcg tablet   No No   Sig: TAKE 1 TABLET (175 MCG TOTAL) BY MOUTH DAILY IN THE EARLY MORNING   nicotine (NICODERM CQ) 21 mg/24 hr TD 24 hr patch   No No   Sig: Place 1 patch on the skin over 24 hours daily   nicotine polacrilex (NICORETTE) 2 mg gum   No No   Sig: Chew 1 each (2 mg total) every 2 (two) hours as needed for smoking cessation      Facility-Administered Medications: None        Past Medical History:   Diagnosis Date    Disease of thyroid gland     History of atrial fibrillation     assocaiated with thyroid disease    Stroke (HCC)     stroke syndrome, occipital infarction- associated w/ afib    Thrombocytopenia (HCC)        Past Surgical History:   Procedure Laterality Date    CARDIAC ELECTROPHYSIOLOGY PROCEDURE N/A 9/27/2022    Procedure: Cardiac eps/afib ablation;  Surgeon: Talat Mauricio DO;  [facility];  Service: Cardiology    LIPOMA RESECTION Left 8/15/2017    Procedure: CALF FOREIGN BODY REMOVAL WITH FLUORO;  Surgeon: Mukesh Richey MD;  Location: [facility];  Service: General    TOTAL THYROIDECTOMY         Family History   Problem Relation Age of Onset    Thyroid disease Mother     Multiple sclerosis Mother     Heart disease Father     Thyroid disease Sister      I have reviewed and agree with the history as documented.    E-Cigarette/Vaping    E-Cigarette Use Former User      E-Cigarette/Vaping Substances     Social History     Tobacco Use    Smoking status: Every Day     Current packs/day: 0.50     Types: Cigarettes    Smokeless tobacco: Never    Tobacco comments:     working on quitting.  USING VAPE PEN   Vaping Use    Vaping status: Former   Substance Use Topics    Alcohol use: Yes     Alcohol/week: 3.0 standard drinks of alcohol     Types: 3 Cans of beer per week     Comment: daily    Drug use: No       Review of Systems   Musculoskeletal:  Positive for arthralgias and joint swelling. Negative for back pain and gait problem.   Neurological:  Negative for headaches.   All other systems reviewed and are negative.      Physical Exam  Physical Exam  Vitals and nursing note reviewed.   Constitutional:       Appearance: He is well-developed.   HENT:      Head: Normocephalic.      Right Ear: External ear normal.      Left Ear: External ear normal.      Nose: Nose normal.      Mouth/Throat:      Mouth: Mucous membranes are moist.      Pharynx: Oropharynx is  clear.   Eyes:      General: Lids are normal.      Extraocular Movements: Extraocular movements intact.      Pupils: Pupils are equal, round, and reactive to light.   Pulmonary:      Effort: Pulmonary effort is normal. No respiratory distress.   Musculoskeletal:         General: Swelling and tenderness present. No deformity.      Right elbow: Normal.      Left elbow: Normal.      Right wrist: Normal.      Left wrist: Swelling, tenderness, bony tenderness and snuff box tenderness present. No deformity, lacerations or crepitus. Decreased range of motion.        Arms:       Cervical back: Normal range of motion and neck supple.   Skin:     General: Skin is warm and dry.   Neurological:      Mental Status: He is alert and oriented to person, place, and time.   Psychiatric:         Mood and Affect: Mood normal.         Vital Signs  ED Triage Vitals [01/21/24 0944]   Temperature Pulse Respirations Blood Pressure SpO2   99.1 °F (37.3 °C) 68 18 136/78 100 %      Temp Source Heart Rate Source Patient Position - Orthostatic VS BP Location FiO2 (%)   Oral Monitor Sitting Right arm --      Pain Score       6           Vitals:    01/21/24 0944   BP: 136/78   Pulse: 68   Patient Position - Orthostatic VS: Sitting         Visual Acuity      ED Medications  Medications   acetaminophen (TYLENOL) tablet 650 mg (650 mg Oral Given 1/21/24 0959)   tetanus-diphtheria-acellular pertussis (BOOSTRIX) IM injection 0.5 mL (0.5 mL Intramuscular Given 1/21/24 1001)       Diagnostic Studies  Results Reviewed       None                   XR wrist 3+ views LEFT   ED Interpretation by Holly Leslie DO (01/21 1014)   ? Nondisplaced scaphoid fracture                   Procedures  Splint application    Date/Time: 1/21/2024 10:47 AM    Performed by: Holly Leslie DO  Authorized by: Holly Leslie DO  [place] Protocol:  Procedure performed by:  Consent: Verbal consent obtained.  Consent given by: patient  Patient identity confirmed: verbally with  patient    Pre-procedure details:     Sensation:  Normal  Procedure details:     Laterality:  Left    Location:  Wrist    Wrist:  L wristCast type:  Short arm        Splint type:  Thumb spica    Supplies:  Ortho-Glass  Post-procedure details:     Pain:  Improved    Sensation:  Normal    Patient tolerance of procedure:  Tolerated well, no immediate complications           ED Course                                             Medical Decision Making  43-year-old right-handed male presents with left wrist pain after falling.  Differential diagnosis includes but not limited to acute fracture, dislocation, subluxation, sprain/strain, soft tissue injury    Problems Addressed:  Occult closed fracture of scaphoid: acute illness or injury    Amount and/or Complexity of Data Reviewed  Radiology: ordered and independent interpretation performed. Decision-making details documented in ED Course.    Risk  OTC drugs.  Prescription drug management.  Risk Details: 43-year-old male with acute left wrist pain after falling.  Patient does have pain localized to the scaphoid and pain with compression of the thumb and in the anatomic snuffbox.  Patient's x-ray has questionable subtle deformity of the scaphoid.  Patient placed in thumb spica and provided with orthopedic follow-up.  Discussed importance of keeping splint in place until reevaluated.  We discussed signs and symptoms to return to the emergency department.  Patient felt comfortable discharge plan             Disposition  Final diagnoses:   Scaphoid fracture - occult   Occult closed fracture of scaphoid     Time reflects when diagnosis was documented in both MDM as applicable and the Disposition within this note       Time User Action Codes Description Comment    1/21/2024 10:20 AM Holly Leslie Add [S62.009A] Scaphoid fracture     1/21/2024 10:26 AM Holly Leslie Modify [S62.009A] Scaphoid fracture occult    1/21/2024 10:26 AM Holly Leslie Add [S62.009A] Occult closed fracture of  scaphoid           ED Disposition       ED Disposition   Discharge    Condition   Stable    Date/Time   Sun Jan 21, 2024 10:26 AM    Comment   Eliseo Roche discharge to home/self care.                   Follow-up Information       Follow up With Specialties Details Why Contact Info Additional Information    [facility] [place] [facility] Schedule an appointment as soon as possible for a visit in 1 day for fracture evaluation and treatment [address]  [facility] [id number] [phone] [facility] [place], [facility] [address] [facility] [address] [facility], [place], [address]   [phone]            Discharge Medication List as of 1/21/2024 10:29 AM        CONTINUE these medications which have NOT CHANGED    Details   Eliquis 5 MG TAKE 1 TABLET BY MOUTH TWICE A DAY, Normal      levothyroxine 175 mcg tablet TAKE 1 TABLET (175 MCG TOTAL) BY MOUTH DAILY IN THE EARLY MORNING, Starting Sun 10/29/2023, Until Sat 1/27/2024, Normal      nicotine (NICODERM CQ) 21 mg/24 hr TD 24 hr patch Place 1 patch on the skin over 24 hours daily, Starting Wed 10/18/2023, Normal      nicotine polacrilex (NICORETTE) 2 mg gum Chew 1 each (2 mg total) every 2 (two) hours as needed for smoking cessation, Starting Thu 10/12/2023, Normal                 PDMP Review         Value Time User    PDMP Reviewed  Yes 8/11/2022  1:17 AM Wilfred Griffin MD            ED Provider  Electronically Signed by             Holly Leslie,   01/21/24 3725

## 2024-01-21 NOTE — Clinical Note
Eliseo Roche was seen and treated in our emergency department on 1/21/2024.        No work until cleared by Family Doctor/Orthopedics    no use of left wrist until cleared by orthopedics    Diagnosis:     Eliseo  .    He may return on this date: 01/25/2024         If you have any questions or concerns, please don't hesitate to call.      Holly Leslie, DO    ______________________________           _______________          _______________  Hospital Representative                              Date                                Time

## 2024-01-24 ENCOUNTER — OFFICE VISIT (OUTPATIENT)
Dept: OBGYN CLINIC | Facility: CLINIC | Age: 44
End: 2024-01-24
Payer: COMMERCIAL

## 2024-01-24 VITALS
SYSTOLIC BLOOD PRESSURE: 141 MMHG | BODY MASS INDEX: 28.44 KG/M2 | DIASTOLIC BLOOD PRESSURE: 83 MMHG | WEIGHT: 210 LBS | HEIGHT: 72 IN

## 2024-01-24 DIAGNOSIS — S60.212A CONTUSION OF LEFT WRIST, INITIAL ENCOUNTER: Primary | ICD-10-CM

## 2024-01-24 PROCEDURE — 99203 OFFICE O/P NEW LOW 30 MIN: CPT | Performed by: ORTHOPAEDIC SURGERY

## 2024-01-24 NOTE — PROGRESS NOTES
Assessment/Plan:  1. Contusion of left wrist, initial encounter  Ambulatory Referral to Orthopedic Surgery          Subjective history, physical examination performed, diagnostic imaging reviewed at today's visit  Diagnosis was discussed contusion of left wrist.   I recommended a removable splint for comfort      The patient was given an opportunity to ask questions.  Questions were answered to the patient's satisfaction.  X-rays were reviewed with patient at today's visit demonstrating no appropriate signs of a scaphoid fracture of the left wrist.  I did discuss with patient that if he has sustained a scaphoid fracture it would be noticeable in 2 weeks with x-ray examination.  I discussed with patient that upon examination he is not tender where the scaphoid is and he may be experiencing contusion of the left wrist.    Through shared decision making, the patient decided to move forward with continue activity as tolerated.   Brace was given to patient at today's visit. I advised patient that he may use the brace as needed for comfort.  I discussed with patient that if he continues to have pain in the next 2 weeks he may follow-up where we will obtain new x-rays of the left wrist.       cc: left wrist pain.     Subjective:   Eliseo Roche is a right hand dominant 43 y.o. male who presents for initial evaluation of left wrist pain.  Patient suffered a fall on 1/20/2024 and fell on left wrist.   Went to ED and got xrays done and they were suspicious of a scaphoid fracture so he was put in a splint. Patient states today he does have pain and soreness at the wrist and states it is hard for him to move the wrist at times.      Review of Systems   Constitutional:  Negative for chills and fever.   HENT:  Negative for ear pain and sore throat.    Eyes:  Negative for pain and visual disturbance.   Respiratory:  Negative for cough and shortness of breath.    Cardiovascular:  Negative for chest pain and palpitations.    Gastrointestinal:  Negative for abdominal pain and vomiting.   Genitourinary:  Negative for dysuria and hematuria.   Musculoskeletal:  Negative for arthralgias and back pain.   Skin:  Negative for color change and rash.   Neurological:  Negative for seizures and syncope.   All other systems reviewed and are negative.        Past Medical History:   Diagnosis Date    Disease of thyroid gland     History of atrial fibrillation     assocaiated with thyroid disease    Stroke (HCC)     stroke syndrome, occipital infarction- associated w/ afib    Thrombocytopenia (HCC)        Past Surgical History:   Procedure Laterality Date    CARDIAC ELECTROPHYSIOLOGY PROCEDURE N/A 9/27/2022    Procedure: Cardiac eps/afib ablation;  Surgeon: Talat Mauricio DO;  [facility];  Service: Cardiology    LIPOMA RESECTION Left 8/15/2017    Procedure: CALF FOREIGN BODY REMOVAL WITH FLUORO;  Surgeon: Mukesh Richey MD;  Location:  MAIN OR;  Service: General    TOTAL THYROIDECTOMY         Family History   Problem Relation Age of Onset    Thyroid disease Mother     Multiple sclerosis Mother     Heart disease Father     Thyroid disease Sister        Social History     Occupational History    Not on file   Tobacco Use    Smoking status: Every Day     Current packs/day: 0.50     Types: Cigarettes    Smokeless tobacco: Never    Tobacco comments:     working on quitting.  USING VAPE PEN   Vaping Use    Vaping status: Former   Substance and Sexual Activity    Alcohol use: Yes     Alcohol/week: 3.0 standard drinks of alcohol     Types: 3 Cans of beer per week     Comment: daily    Drug use: No    Sexual activity: Not on file         Current Outpatient Medications:     Eliquis 5 MG, TAKE 1 TABLET BY MOUTH TWICE A DAY, Disp: 60 tablet, Rfl: 5    levothyroxine 175 mcg tablet, TAKE 1 TABLET (175 MCG TOTAL) BY MOUTH DAILY IN THE EARLY MORNING, Disp: 90 tablet, Rfl: 0    nicotine (NICODERM CQ) 21 mg/24 hr TD 24 hr patch, Place 1 patch on  the skin over 24 hours daily, Disp: 30 patch, Rfl: 0    nicotine polacrilex (NICORETTE) 2 mg gum, Chew 1 each (2 mg total) every 2 (two) hours as needed for smoking cessation, Disp: 30 each, Rfl: 0    Allergies   Allergen Reactions    Wellbutrin [Bupropion] GI Intolerance    Zoloft [Sertraline] GI Intolerance       Objective:  Vitals:    01/24/24 1418   BP: 141/83       The patient was awake, alert, and oriented to person, place, and time.  No acute distress.  Normocephalic.  EOMI.  Mucous membranes moist.  Normal respiratory effort.    Examination of the affected extremity was compared to the unaffected extremity.  Skin was intact.  No swelling or ecchymosis.  No deformity.  Hand and fingers were warm and well-perfused.  Capillary refill was brisk.  Full active range of motion of the elbows, forearms, wrists, and fingers.  No TTP hook of hamate   No TTP anatomical snuffbox     Imaging/Diagnostic Studies:    I reviewed imaging studies dated 1/21/2024 which included left wrist x-rays 3 views.  These images studies demonstrated no osseous abnormalities.         This document was created using speech voice recognition software.   Grammatical errors, random word insertions, pronoun errors, and incomplete sentences are an occasional consequence of this system due to software limitations, ambient noise, and hardware issues.   Any formal questions or concerns about content, text, or information contained within the body of this dictation should be directly addressed to the provider for clarification.    Scribe Attestation      I,:  Kaleb Booth am acting as a scribe while in the presence of the attending physician.:       I,:  Cece Hernandez MD personally performed the services described in this documentation    as scribed in my presence.:

## 2024-02-06 DIAGNOSIS — E03.9 HYPOTHYROIDISM, UNSPECIFIED TYPE: ICD-10-CM

## 2024-02-06 RX ORDER — LEVOTHYROXINE SODIUM 175 UG/1
175 TABLET ORAL
Qty: 90 TABLET | Refills: 0 | Status: SHIPPED | OUTPATIENT
Start: 2024-02-06 | End: 2024-05-06

## 2024-05-15 DIAGNOSIS — E03.9 HYPOTHYROIDISM, UNSPECIFIED TYPE: ICD-10-CM

## 2024-05-15 RX ORDER — LEVOTHYROXINE SODIUM 175 UG/1
175 TABLET ORAL
Qty: 90 TABLET | Refills: 1 | Status: SHIPPED | OUTPATIENT
Start: 2024-05-15 | End: 2024-11-11

## 2024-06-27 DIAGNOSIS — Z86.79 HISTORY OF ATRIAL FIBRILLATION: ICD-10-CM

## 2024-07-03 DIAGNOSIS — Z86.79 HISTORY OF ATRIAL FIBRILLATION: ICD-10-CM

## 2024-07-03 NOTE — TELEPHONE ENCOUNTER
Medication: Eliquis 5mg tablet    Dose/Frequency: 1 tablet twice a day    Quantity: 180 tablets    Pharmacy: [facility]    Office:   [x] PCP/Provider - Hima German MD  [] Speciality/Provider -     Does the patient have enough for 3 days?   [x] Yes   [] No - Send as HP to POD

## 2024-07-09 DIAGNOSIS — Z86.79 HISTORY OF ATRIAL FIBRILLATION: ICD-10-CM

## 2024-07-09 NOTE — TELEPHONE ENCOUNTER
Reason for call: NOT A DUPLICATE pharmacy told patient they did not receive. Please process   [x] Refill   [] Prior Auth  [] Other:     Office:   [x] PCP/Provider - Hima German MD   [] Specialty/Provider -     Medication: apixaban (Eliquis) 5 mg     Dose/Frequency: Take 1 tablet (5 mg total) by mouth 2 (two) times a day     Quantity: 60    Pharmacy: [facility] #[id number] - [place], [place] - [address] [place] [address] [phone]     Does the patient have enough for 3 days?   [] Yes   [x] No - Send as HP to POD  COMPLETELY OUT OF MEDICATION

## 2024-07-22 ENCOUNTER — OFFICE VISIT (OUTPATIENT)
Dept: FAMILY MEDICINE CLINIC | Facility: CLINIC | Age: 44
End: 2024-07-22
Payer: COMMERCIAL

## 2024-07-22 VITALS
WEIGHT: 201 LBS | DIASTOLIC BLOOD PRESSURE: 76 MMHG | HEIGHT: 72 IN | OXYGEN SATURATION: 97 % | SYSTOLIC BLOOD PRESSURE: 120 MMHG | TEMPERATURE: 98 F | HEART RATE: 55 BPM | BODY MASS INDEX: 27.22 KG/M2 | RESPIRATION RATE: 16 BRPM

## 2024-07-22 DIAGNOSIS — Z78.9 ALCOHOL USE: ICD-10-CM

## 2024-07-22 DIAGNOSIS — Z00.00 PHYSICAL EXAM, ANNUAL: Primary | ICD-10-CM

## 2024-07-22 DIAGNOSIS — Z86.73 HISTORY OF CVA (CEREBROVASCULAR ACCIDENT) WITHOUT RESIDUAL DEFICITS: ICD-10-CM

## 2024-07-22 DIAGNOSIS — Z30.09 VASECTOMY EVALUATION: ICD-10-CM

## 2024-07-22 DIAGNOSIS — E89.0 POSTOPERATIVE HYPOTHYROIDISM: ICD-10-CM

## 2024-07-22 DIAGNOSIS — E78.5 HYPERLIPIDEMIA, UNSPECIFIED HYPERLIPIDEMIA TYPE: ICD-10-CM

## 2024-07-22 DIAGNOSIS — L98.9 SKIN LESION: ICD-10-CM

## 2024-07-22 DIAGNOSIS — Z86.79 HISTORY OF ATRIAL FIBRILLATION: ICD-10-CM

## 2024-07-22 DIAGNOSIS — F17.200 CURRENT SMOKER: ICD-10-CM

## 2024-07-22 DIAGNOSIS — L80 VITILIGO: ICD-10-CM

## 2024-07-22 PROBLEM — F10.90 ALCOHOL USE: Status: ACTIVE | Noted: 2022-11-02

## 2024-07-22 PROCEDURE — 99396 PREV VISIT EST AGE 40-64: CPT | Performed by: NURSE PRACTITIONER

## 2024-07-22 PROCEDURE — 99214 OFFICE O/P EST MOD 30 MIN: CPT | Performed by: NURSE PRACTITIONER

## 2024-07-22 NOTE — ASSESSMENT & PLAN NOTE
Tobacco Cessation Counseling: Tobacco cessation counseling and education was provided. The patient is sincerely urged to quit consumption of tobacco. He is not ready to quit tobacco. The numerous health risks of tobacco consumption were discussed. If he decides to quit, there are a number of helpful adjunctive aids, and he can see me to discuss nicotine replacement therapy, chantix, or bupropion anytime in the future..

## 2024-07-22 NOTE — ASSESSMENT & PLAN NOTE
S/P ablation in 2022.   No episodes of A. Fib since.   He was symptomatic in the past with a.fib, palpitations, dizziness, lightheaded.   Remains anticoagulated on Eliquis.   Overdue for follow up with Dr. Mauricio.

## 2024-07-22 NOTE — ASSESSMENT & PLAN NOTE
Total cholesterol 210, Triglycerides 63, HDL 67, .   Regular exercise and heart healthy eating recommended.   ASCVD risk score 2.6%.

## 2024-07-22 NOTE — PROGRESS NOTES
[facility] HEALTH MAINTENANCE OFFICE VISIT  [facility] - [facility]    NAME: Eliseo Roche  AGE: 43 y.o. SEX: male  : 1980     DATE: 2024    Assessment and Plan     1. Physical exam, annual  2. Postoperative hypothyroidism  Assessment & Plan:  Total thyroidectomy reportedly from hyperthyroidism (graves disease) that could not be controlled by medications or radioactive iodine.   TSH has been stable since  on current dose of levothyroxine 175 mcg daily.   Managed by PCP.  No longer follows with endocrinology.   Orders:  -     TSH, 3rd generation; Future  3. History of atrial fibrillation  Assessment & Plan:  S/P ablation in .   No episodes of A. Fib since.   He was symptomatic in the past with a.fib, palpitations, dizziness, lightheaded.   Remains anticoagulated on Eliquis.   Overdue for follow up with Dr. Mauricio.   Orders:  -     CBC and differential; Future  -     Comprehensive metabolic panel; Future  -     TSH, 3rd generation; Future  4. Hyperlipidemia, unspecified hyperlipidemia type  Assessment & Plan:  Total cholesterol 210, Triglycerides 63, HDL 67, .   Regular exercise and heart healthy eating recommended.   ASCVD risk score 2.6%.  Orders:  -     Lipid panel; Future  5. History of CVA (cerebrovascular accident) without residual deficits  Assessment & Plan:  CVA at age 27, suspected to be caused by a.fib induced by hyperthyroidism.     6. Current smoker  Assessment & Plan:  Tobacco Cessation Counseling: Tobacco cessation counseling and education was provided. The patient is sincerely urged to quit consumption of tobacco. He is not ready to quit tobacco. The numerous health risks of tobacco consumption were discussed. If he decides to quit, there are a number of helpful adjunctive aids, and he can see me to discuss nicotine replacement therapy, chantix, or bupropion anytime in the future..  7. Alcohol use  Assessment & Plan:  Drinks 3-4  drinks daily in the evening.   Reviewed healthy alcohol intake for adult male is 2 drinks per day.   8. Skin lesion  Dermatology referral as requested for skin lesion on his back he would like to have checked and for rash on his skin with sun exposure.   -     Ambulatory Referral to Dermatology; Future  9. Vasectomy evaluation  -     Ambulatory Referral to Urology; Future  10. Vitiligo  Assessment & Plan:  Hypopigmented skin on hands, elbows, knees noted especially during summer months.   Present for years.       Patient Counseling:   Nutrition: Stressed importance of a well balanced diet, moderation of sodium/saturated fat, caloric balance and sufficient intake of fiber  Exercise: Stressed the importance of regular exercise with a goal of 150 minutes per week  Dental Health: Discussed daily flossing and brushing and regular dental visits     Immunizations reviewed: Up To Date  Discussed benefits of:  Screening labs.    Follow up in one year or sooner if needed.         Chief Complaint     Chief Complaint   Patient presents with    Establish Care       History of Present Illness     Eliseo Roche is a 43 year old male presenting today for annual exam and to establish care at our practice.     He is feeling well, has no complaints today.   Started multivitamin recently for low normal B12.     Very active at work--, does a lot of walking and heavy lifting at work.   Has 2 young sons ages 4.5 and 1.5 at home to care for.                   Well Adult Physical   Patient here for a comprehensive physical exam.      Diet and Physical Activity  Diet: well balanced diet  Exercise: frequently      Depression Screen  PHQ-2/9 Depression Screening    Little interest or pleasure in doing things: 0 - not at all  Feeling down, depressed, or hopeless: 0 - not at all  PHQ-2 Score: 0  PHQ-2 Interpretation: Negative depression screen          General Health  Hearing: Normal:  bilateral  Vision: no vision  problems  Dental: regular dental visits          The following portions of the patient's history were reviewed and updated as appropriate: allergies, current medications, past family history, past medical history, past social history, past surgical history and problem list.    Review of Systems     Review of Systems   Constitutional: Negative.    HENT: Negative.     Eyes:  Negative for visual disturbance.   Respiratory: Negative.     Cardiovascular: Negative.    Gastrointestinal: Negative.    Genitourinary: Negative.    Musculoskeletal: Negative.    Skin: Negative.    Neurological: Negative.    Hematological: Negative.    Psychiatric/Behavioral: Negative.         Past Medical History     Past Medical History:   Diagnosis Date    Disease of thyroid gland     History of atrial fibrillation     assocaiated with thyroid disease    Stroke (HCC)     stroke syndrome, occipital infarction- associated w/ afib       Past Surgical History     Past Surgical History:   Procedure Laterality Date    CARDIAC ELECTROPHYSIOLOGY PROCEDURE N/A 9/27/2022    Procedure: Cardiac eps/afib ablation;  Surgeon: Talat Mauricio DO;  [facility];  Service: Cardiology    LIPOMA RESECTION Left 8/15/2017    Procedure: CALF FOREIGN BODY REMOVAL WITH FLUORO;  Surgeon: Mukesh Richey MD;  Location: [facility] OR;  Service: General    TOTAL THYROIDECTOMY         Social History     Social History     Socioeconomic History    Marital status: /Civil Union     Spouse name: None    Number of children: None    Years of education: None    Highest education level: None   Occupational History    None   Tobacco Use    Smoking status: Every Day     Current packs/day: 1.00     Average packs/day: 1 pack/day for 17.6 years (17.6 ttl pk-yrs)     Types: Cigarettes     Start date: 1/1/2007    Smokeless tobacco: Never   Vaping Use    Vaping status: Former   Substance and Sexual Activity    Alcohol use: Not Currently     Comment: 3-4 drinks per day     Drug use: No    Sexual activity: None   Other Topics Concern    None   Social History Narrative    None     Social Determinants of Health     Financial Resource Strain: Not on file   Food Insecurity: Not on file   Transportation Needs: Not on file   Physical Activity: Not on file   Stress: Not on file   Social Connections: Not on file   Intimate Partner Violence: Not on file   Housing Stability: Not on file       Family History     Family History   Problem Relation Age of Onset    Thyroid disease Mother     Multiple sclerosis Mother     Heart disease Father     Thyroid disease Sister     Cancer Maternal Grandfather         bladder    Heart disease Maternal Grandfather     Brain cancer Maternal Aunt        Current Medications       Current Outpatient Medications:     apixaban (Eliquis) 5 mg, Take 1 tablet (5 mg total) by mouth 2 (two) times a day, Disp: 60 tablet, Rfl: 5    levothyroxine 175 mcg tablet, TAKE 1 TABLET (175 MCG TOTAL) BY MOUTH DAILY IN THE EARLY MORNING, Disp: 90 tablet, Rfl: 1     Allergies     Allergies   Allergen Reactions    Wellbutrin [Bupropion] GI Intolerance    Zoloft [Sertraline] GI Intolerance       Objective     /76 (BP Location: Right arm, Patient Position: Sitting, Cuff Size: Large)   Pulse 55   Temp 98 °F (36.7 °C) (Temporal)   Resp 16   Ht 6' (1.829 m)   Wt 91.2 kg (201 lb)   SpO2 97%   BMI 27.26 kg/m²      Physical Exam  Vitals and nursing note reviewed.   Constitutional:       General: He is not in acute distress.     Appearance: Normal appearance. He is not ill-appearing.   HENT:      Head: Atraumatic.      Right Ear: Tympanic membrane normal.      Left Ear: Tympanic membrane normal.      Nose: Nose normal.      Mouth/Throat:      Mouth: Mucous membranes are moist.      Pharynx: Oropharynx is clear.   Eyes:      Conjunctiva/sclera: Conjunctivae normal.      Pupils: Pupils are equal, round, and reactive to light.   Cardiovascular:      Rate and Rhythm: Normal rate and  regular rhythm.      Heart sounds: No murmur heard.  Pulmonary:      Effort: Pulmonary effort is normal. No respiratory distress.      Breath sounds: Normal breath sounds.   Musculoskeletal:      Cervical back: Normal range of motion and neck supple.      Right lower leg: No edema.      Left lower leg: No edema.   Lymphadenopathy:      Cervical: No cervical adenopathy.   Skin:     General: Skin is warm and dry.   Neurological:      Mental Status: He is alert.      Gait: Gait normal.   Psychiatric:         Mood and Affect: Mood normal.               JACI Childs  [facility]

## 2024-07-22 NOTE — ASSESSMENT & PLAN NOTE
Drinks 3-4 drinks daily in the evening.   Reviewed healthy alcohol intake for adult male is 2 drinks per day.

## 2024-07-22 NOTE — ASSESSMENT & PLAN NOTE
Total thyroidectomy reportedly from hyperthyroidism (graves disease) that could not be controlled by medications or radioactive iodine.   TSH has been stable since 2022 on current dose of levothyroxine 175 mcg daily.   Managed by PCP.  No longer follows with endocrinology.

## 2024-07-23 NOTE — ASSESSMENT & PLAN NOTE
Hypopigmented skin on hands, elbows, knees noted especially during summer months.   Present for years.

## 2024-09-12 ENCOUNTER — CONSULT (OUTPATIENT)
Dept: UROLOGY | Facility: AMBULATORY SURGERY CENTER | Age: 44
End: 2024-09-12

## 2024-09-12 VITALS
HEIGHT: 72 IN | SYSTOLIC BLOOD PRESSURE: 128 MMHG | HEART RATE: 76 BPM | BODY MASS INDEX: 27.22 KG/M2 | WEIGHT: 201 LBS | DIASTOLIC BLOOD PRESSURE: 80 MMHG | OXYGEN SATURATION: 97 %

## 2024-09-12 DIAGNOSIS — Z30.09 VASECTOMY EVALUATION: ICD-10-CM

## 2024-09-12 RX ORDER — ALPRAZOLAM 1 MG
TABLET ORAL
Qty: 1 TABLET | Refills: 0 | Status: SHIPPED | OUTPATIENT
Start: 2024-09-12

## 2025-01-22 DIAGNOSIS — E03.9 HYPOTHYROIDISM, UNSPECIFIED TYPE: ICD-10-CM

## 2025-01-22 RX ORDER — LEVOTHYROXINE SODIUM 175 UG/1
175 TABLET ORAL
Qty: 30 TABLET | Refills: 0 | Status: SHIPPED | OUTPATIENT
Start: 2025-01-22 | End: 2025-07-21

## 2025-01-22 NOTE — TELEPHONE ENCOUNTER
Patient requires updated blood work and has previously placed orders. Please contact patient to go for labs. Courtesy refill provided.  TSH    A [employer] message was sent to the patient letting them know a courtesy refill has been provided and to complete lab(s) prior to next refill. Please follow up to make sure pt receives/reads message and completes blood work.

## 2025-02-20 ENCOUNTER — PROCEDURE VISIT (OUTPATIENT)
Dept: UROLOGY | Facility: AMBULATORY SURGERY CENTER | Age: 45
End: 2025-02-20
Payer: COMMERCIAL

## 2025-02-20 VITALS
WEIGHT: 212 LBS | HEIGHT: 72 IN | BODY MASS INDEX: 28.71 KG/M2 | HEART RATE: 75 BPM | SYSTOLIC BLOOD PRESSURE: 140 MMHG | DIASTOLIC BLOOD PRESSURE: 76 MMHG | OXYGEN SATURATION: 99 %

## 2025-02-20 DIAGNOSIS — Z30.2 ENCOUNTER FOR STERILIZATION: Primary | ICD-10-CM

## 2025-02-20 PROCEDURE — 88302 TISSUE EXAM BY PATHOLOGIST: CPT | Performed by: STUDENT IN AN ORGANIZED HEALTH CARE EDUCATION/TRAINING PROGRAM

## 2025-02-20 PROCEDURE — 55250 REMOVAL OF SPERM DUCT(S): CPT | Performed by: UROLOGY

## 2025-02-20 NOTE — PROGRESS NOTES
Procedure: Vasectomy   Risks, benefits and alternatives were discussed with the patient. We discussed possible complications, including infection and bleeding.   He understands that he is at higher risk due to eliquis use. He held for 2 days, which may not be sufficient. He wishes to proceed.    The patient was premedicated with lorazepam.   The genitalia were prepped with Betadine.  Sterile drape was placed.  The scrotum was anesthetized with of lidocaine 2%.   The skin was opened with the sharp clamp and the right vas was grasped with the ring clamp. The perivasal sheath and vessels were then dissected off bluntly and the vas was doubly clamped. A portion was excised and both ends were then fulgurated and tied with 0 silk. Seeing good hemostasis, they were returned to the scrotum and the skin was closed with a chromic stich.    The skin was opened with the sharp clamp and the left vas was grasped with the ring clamp. The perivasal sheath and vessels were then dissected off bluntly and the vas was doubly clamped. A portion was excised and both ends were then fulgurated and tied with 0 silk. Seeing good hemostasis, they were returned to the scrotum and the skin was closed with a chromic stich.   Antibiotic ointment was applied.   The patient tolerated the procedure well. there were no complications. He understands he is not to be considered sterile until negative semen analysis is obtained postprocedure. He also understands he should rest, ice and elevate the scrotum for atleast 48 hours to avoid complication such as hematoma.

## 2025-02-24 PROCEDURE — 88302 TISSUE EXAM BY PATHOLOGIST: CPT | Performed by: STUDENT IN AN ORGANIZED HEALTH CARE EDUCATION/TRAINING PROGRAM

## 2025-03-24 ENCOUNTER — HOSPITAL ENCOUNTER (EMERGENCY)
Facility: HOSPITAL | Age: 45
Discharge: HOME | End: 2025-03-24
Attending: EMERGENCY MEDICINE
Payer: COMMERCIAL

## 2025-03-24 VITALS
SYSTOLIC BLOOD PRESSURE: 140 MMHG | TEMPERATURE: 97.9 F | HEART RATE: 65 BPM | RESPIRATION RATE: 18 BRPM | DIASTOLIC BLOOD PRESSURE: 79 MMHG | OXYGEN SATURATION: 100 %

## 2025-03-24 DIAGNOSIS — L30.4 INTERTRIGO: Primary | ICD-10-CM

## 2025-03-24 PROCEDURE — 99283 EMERGENCY DEPT VISIT LOW MDM: CPT | Performed by: EMERGENCY MEDICINE

## 2025-03-24 PROCEDURE — 99283 EMERGENCY DEPT VISIT LOW MDM: CPT

## 2025-03-24 RX ORDER — NYSTATIN 100000 U/G
CREAM TOPICAL
Qty: 15 G | Refills: 0 | Status: SHIPPED | OUTPATIENT
Start: 2025-03-24

## 2025-03-24 RX ORDER — NYSTATIN 100000 U/G
CREAM TOPICAL ONCE
Status: COMPLETED | OUTPATIENT
Start: 2025-03-24 | End: 2025-03-24

## 2025-03-24 RX ADMIN — NYSTATIN CREAM: 100000 CREAM TOPICAL at 10:45

## 2025-03-24 NOTE — ED PROVIDER NOTES
Time reflects when diagnosis was documented in both MDM as applicable and the Disposition within this note       Time User Action Codes Description Comment    3/24/2025 10:22 AM Marita Abdi Add [L30.4] Intertrigo     3/24/2025 10:23 AM Marita Abdi Modify [L30.4] Intertrigo Left inguinal/groin          ED Disposition       ED Disposition   Discharge    Condition   Stable    Date/Time   Mon Mar 24, 2025 10:22 AM    Comment   Eliseo Roche discharge to home/self care.                   Assessment & Plan       Medical Decision Making  Risk  Prescription drug management.      Patient's rash is primarily concerning for intertrigo, fungal.  Possibly candidal.  Do not think this is a bacterial rash, do not suspect cellulitis, no fluctuance or induration indicative of abscess.  Do not suspect Jess's gangrene.  Rash is not involving scrotum.  No purulence.  Started on nystatin cream.  May apply barrier cream on top of the nystatin cream.  Advise urology follow-up.  May follow-up with dermatology as well.  Return precautions were discussed, he verbalized understanding and is in agreement with plan.         Medications   nystatin (MYCOSTATIN) cream (has no administration in time range)       ED Risk Strat Scores                            SBIRT 22yo+      Flowsheet Row Most Recent Value   Initial Alcohol Screen: US AUDIT-C     1. How often do you have a drink containing alcohol? 6  [Few Beers] Filed at: 03/24/2025 0947   2. How many drinks containing alcohol do you have on a typical day you are drinking?  2 Filed at: 03/24/2025 0947   3a. Male UNDER 65: How often do you have five or more drinks on one occasion? 0 Filed at: 03/24/2025 0947   3b. FEMALE Any Age, or MALE 65+: How often do you have 4 or more drinks on one occassion? 0 Filed at: 03/24/2025 0947   Audit-C Score 8 Filed at: 03/24/2025 0947   ASAD: How many times in the past year have you...    Used an illegal drug or used a prescription medication for  non-medical reasons? Never Filed at: 03/24/2025 0947                            History of Present Illness       Chief Complaint   Patient presents with    Wound Check     Had vasectomy a few weeks ago. Then had gotten a rash on his left inner groin that is painful and irritating has tried OTC meds without relief. Skin is very raw. No fevers at home.        Past Medical History:   Diagnosis Date    Disease of thyroid gland     History of atrial fibrillation     assocaiated with thyroid disease    Stroke (HCC)     stroke syndrome, occipital infarction- associated w/ afib      Past Surgical History:   Procedure Laterality Date    CARDIAC ELECTROPHYSIOLOGY PROCEDURE N/A 9/27/2022    Procedure: Cardiac eps/afib ablation;  Surgeon: Talat Mauricio DO;  Location: [facility];  Service: Cardiology    LIPOMA RESECTION Left 8/15/2017    Procedure: CALF FOREIGN BODY REMOVAL WITH FLUORO;  Surgeon: Mukesh Richey MD;  Location: [facility];  Service: General    TOTAL THYROIDECTOMY        Family History   Problem Relation Age of Onset    Thyroid disease Mother     Multiple sclerosis Mother     Heart disease Father     Thyroid disease Sister     Cancer Maternal Grandfather         bladder    Heart disease Maternal Grandfather     Brain cancer Maternal Aunt       Social History     Tobacco Use    Smoking status: Every Day     Current packs/day: 1.00     Average packs/day: 1 pack/day for 18.2 years (18.2 ttl pk-yrs)     Types: Cigarettes     Start date: 1/1/2007    Smokeless tobacco: Never   Vaping Use    Vaping status: Former   Substance Use Topics    Alcohol use: Not Currently     Comment: 3-4 drinks per day    Drug use: No      E-Cigarette/Vaping    E-Cigarette Use Former User       E-Cigarette/Vaping Substances    Nicotine No     THC No     CBD No     Flavoring No     Other No     Unknown No       I have reviewed and agree with the history as documented.     44-year-old male presenting to the emergency department with  rash to the left groin.  States has been present for the last 2 weeks.  He had vasectomy done in February.  States for the last 2 weeks he has had a rash to his left groin, it is not spreading or worsening, has been the same for the last 2 weeks, he has been applying Aquaphor without improvement.  Denies any fevers or chills.  Is not diabetic.  Denies any scrotal pain.  States the rash itself is somewhat tender.  Has not seen PCP or urologist for this yet.  No urinary symptoms.        Review of Systems   Constitutional:  Negative for chills and fever.   Cardiovascular:  Negative for chest pain and palpitations.   Gastrointestinal:  Negative for abdominal pain, nausea and vomiting.   Genitourinary:  Negative for flank pain, penile swelling, scrotal swelling and testicular pain.   Skin:  Positive for rash.           Objective       ED Triage Vitals [03/24/25 0947]   Temperature Pulse Blood Pressure Respirations SpO2 Patient Position - Orthostatic VS   97.9 °F (36.6 °C) 65 140/79 18 100 % Sitting      Temp Source Heart Rate Source BP Location FiO2 (%) Pain Score    Oral -- Right arm -- 6      Vitals      Date and Time Temp Pulse SpO2 Resp BP Pain Score FACES Pain Rating User   03/24/25 0947 97.9 °F (36.6 °C) 65 100 % 18 140/79 6 -- IRA            Physical Exam  Constitutional:       General: He is not in acute distress.  HENT:      Head: Normocephalic and atraumatic.      Nose: Nose normal.   Eyes:      Conjunctiva/sclera: Conjunctivae normal.   Cardiovascular:      Rate and Rhythm: Normal rate.   Pulmonary:      Effort: Pulmonary effort is normal. No respiratory distress.   Abdominal:      General: There is no distension.   Genitourinary:     Testes: Normal.   Musculoskeletal:         General: Normal range of motion.      Cervical back: Normal range of motion.   Skin:     Comments: Left groin rash, erythematous, there are satellite lesions with confluence in the groin fold, no crepitus, no fluctuance or induration,  minimally tender.  No increased warmth.   Neurological:      Mental Status: He is alert and oriented to person, place, and time.         Results Reviewed       None            No orders to display       Procedures    ED Medication and Procedure Management   Prior to Admission Medications   Prescriptions Last Dose Informant Patient Reported? Taking?   ALPRAZolam (XANAX) 1 mg tablet   No No   Sig: Take 1 hour prior to procedure   apixaban (Eliquis) 5 mg  Self No No   Sig: Take 1 tablet (5 mg total) by mouth 2 (two) times a day   Patient not taking: Reported on 2/20/2025   levothyroxine 175 mcg tablet   No No   Sig: TAKE 1 TABLET (175 MCG TOTAL) BY MOUTH DAILY IN THE EARLY MORNING      Facility-Administered Medications: None     Patient's Medications   Discharge Prescriptions    NYSTATIN (MYCOSTATIN) CREAM    Apply to affected area 2 times daily until resolution       Start Date: 3/24/2025 End Date: --       Order Dose: --       Quantity: 15 g    Refills: 0     No discharge procedures on file.  ED SEPSIS DOCUMENTATION   Time reflects when diagnosis was documented in both MDM as applicable and the Disposition within this note       Time User Action Codes Description Comment    3/24/2025 10:22 AM Marita Abdi Add [L30.4] Intertrigo     3/24/2025 10:23 AM Marita Abdi Modify [L30.4] Intertrigo Left inguinal/groin                 Marita Abdi DO  03/24/25 1041

## 2025-08-04 ENCOUNTER — OFFICE VISIT (OUTPATIENT)
Dept: FAMILY MEDICINE CLINIC | Facility: CLINIC | Age: 45
End: 2025-08-04
Payer: COMMERCIAL

## 2025-08-04 ENCOUNTER — TELEPHONE (OUTPATIENT)
Dept: FAMILY MEDICINE CLINIC | Facility: CLINIC | Age: 45
End: 2025-08-04

## 2025-08-04 VITALS
OXYGEN SATURATION: 97 % | WEIGHT: 212 LBS | HEIGHT: 72 IN | BODY MASS INDEX: 28.71 KG/M2 | HEART RATE: 71 BPM | RESPIRATION RATE: 16 BRPM | SYSTOLIC BLOOD PRESSURE: 120 MMHG | DIASTOLIC BLOOD PRESSURE: 80 MMHG | TEMPERATURE: 98.4 F

## 2025-08-04 DIAGNOSIS — F17.200 TOBACCO USE DISORDER: ICD-10-CM

## 2025-08-04 DIAGNOSIS — Z86.79 HISTORY OF ATRIAL FIBRILLATION: ICD-10-CM

## 2025-08-04 DIAGNOSIS — F10.90 ALCOHOL USE: ICD-10-CM

## 2025-08-04 DIAGNOSIS — E78.5 HYPERLIPIDEMIA, UNSPECIFIED HYPERLIPIDEMIA TYPE: ICD-10-CM

## 2025-08-04 DIAGNOSIS — Z86.73 HISTORY OF CVA (CEREBROVASCULAR ACCIDENT) WITHOUT RESIDUAL DEFICITS: ICD-10-CM

## 2025-08-04 DIAGNOSIS — Z13.1 DIABETES MELLITUS SCREENING: ICD-10-CM

## 2025-08-04 DIAGNOSIS — Z00.00 ANNUAL PHYSICAL EXAM: Primary | ICD-10-CM

## 2025-08-04 DIAGNOSIS — E89.0 POSTOPERATIVE HYPOTHYROIDISM: ICD-10-CM

## 2025-08-04 PROCEDURE — 99214 OFFICE O/P EST MOD 30 MIN: CPT | Performed by: NURSE PRACTITIONER

## 2025-08-04 PROCEDURE — 99396 PREV VISIT EST AGE 40-64: CPT | Performed by: NURSE PRACTITIONER

## 2025-08-04 RX ORDER — LEVOTHYROXINE SODIUM 175 UG/1
175 TABLET ORAL
Qty: 90 TABLET | Refills: 3 | Status: SHIPPED | OUTPATIENT
Start: 2025-08-04 | End: 2026-01-31

## 2025-08-04 RX ORDER — NICOTINE 21 MG/24HR
1 PATCH, TRANSDERMAL 24 HOURS TRANSDERMAL EVERY 24 HOURS
Qty: 28 PATCH | Refills: 0 | Status: SHIPPED | OUTPATIENT
Start: 2025-08-04

## 2025-08-04 RX ORDER — NICOTINE 21 MG/24HR
1 PATCH, TRANSDERMAL 24 HOURS TRANSDERMAL EVERY 24 HOURS
Qty: 28 PATCH | Refills: 0 | Status: SHIPPED | OUTPATIENT
Start: 2025-09-01

## 2025-08-09 ENCOUNTER — APPOINTMENT (OUTPATIENT)
Dept: LAB | Facility: CLINIC | Age: 45
End: 2025-08-09
Payer: COMMERCIAL

## 2025-08-10 ENCOUNTER — RESULTS FOLLOW-UP (OUTPATIENT)
Dept: FAMILY MEDICINE CLINIC | Facility: CLINIC | Age: 45
End: 2025-08-10

## 2025-08-10 ENCOUNTER — RESULTS FOLLOW-UP (OUTPATIENT)
Dept: UROLOGY | Facility: AMBULATORY SURGERY CENTER | Age: 45
End: 2025-08-10